# Patient Record
Sex: MALE | Race: OTHER | Employment: UNEMPLOYED | URBAN - METROPOLITAN AREA
[De-identification: names, ages, dates, MRNs, and addresses within clinical notes are randomized per-mention and may not be internally consistent; named-entity substitution may affect disease eponyms.]

---

## 2018-06-15 ENCOUNTER — OFFICE VISIT (OUTPATIENT)
Dept: NEUROLOGY | Age: 33
End: 2018-06-15

## 2018-06-15 VITALS
HEIGHT: 69 IN | OXYGEN SATURATION: 98 % | SYSTOLIC BLOOD PRESSURE: 124 MMHG | WEIGHT: 277 LBS | DIASTOLIC BLOOD PRESSURE: 80 MMHG | HEART RATE: 82 BPM | BODY MASS INDEX: 41.03 KG/M2

## 2018-06-15 DIAGNOSIS — M54.2 NECK PAIN: ICD-10-CM

## 2018-06-15 DIAGNOSIS — R53.82 CHRONIC FATIGUE: Primary | ICD-10-CM

## 2018-06-15 DIAGNOSIS — G44.229 CHRONIC TENSION-TYPE HEADACHE, NOT INTRACTABLE: ICD-10-CM

## 2018-06-15 PROBLEM — E66.01 OBESITY, MORBID (HCC): Status: ACTIVE | Noted: 2018-06-15

## 2018-06-18 ENCOUNTER — TELEPHONE (OUTPATIENT)
Dept: SLEEP MEDICINE | Age: 33
End: 2018-06-18

## 2018-06-18 ENCOUNTER — DOCUMENTATION ONLY (OUTPATIENT)
Dept: NEUROLOGY | Age: 33
End: 2018-06-18

## 2018-06-18 NOTE — TELEPHONE ENCOUNTER
Spoke with patient on 06/18/2018 to schedule a consult visit per Dr. Acacia Montes. Patient states he will call back to schedule appt.

## 2018-07-30 ENCOUNTER — DOCUMENTATION ONLY (OUTPATIENT)
Dept: NEUROLOGY | Age: 33
End: 2018-07-30

## 2022-03-19 PROBLEM — E66.01 OBESITY, MORBID (HCC): Status: ACTIVE | Noted: 2018-06-15

## 2022-03-21 ENCOUNTER — HOSPITAL ENCOUNTER (EMERGENCY)
Age: 37
Discharge: HOME | End: 2022-03-21
Attending: EMERGENCY MEDICINE

## 2022-03-21 VITALS
HEART RATE: 85 BPM | OXYGEN SATURATION: 98 % | SYSTOLIC BLOOD PRESSURE: 127 MMHG | TEMPERATURE: 98.8 F | DIASTOLIC BLOOD PRESSURE: 82 MMHG | HEIGHT: 69 IN | RESPIRATION RATE: 16 BRPM | WEIGHT: 220 LBS | BODY MASS INDEX: 32.58 KG/M2

## 2022-03-21 DIAGNOSIS — F41.0 ANXIETY ATTACK: Primary | ICD-10-CM

## 2022-03-21 DIAGNOSIS — F51.01 PRIMARY INSOMNIA: ICD-10-CM

## 2022-03-21 PROCEDURE — 99283 EMERGENCY DEPT VISIT LOW MDM: CPT

## 2022-03-21 RX ORDER — UREA 10 %
3 LOTION (ML) TOPICAL
Qty: 60 TABLET | Refills: 0 | Status: SHIPPED | OUTPATIENT
Start: 2022-03-21 | End: 2022-04-10

## 2022-03-21 RX ORDER — HYDROXYZINE PAMOATE 50 MG/1
50 CAPSULE ORAL
Qty: 30 CAPSULE | Refills: 0 | Status: SHIPPED | OUTPATIENT
Start: 2022-03-21

## 2022-03-21 RX ORDER — LORAZEPAM 0.5 MG/1
0.5 TABLET ORAL
Qty: 12 TABLET | Refills: 0 | Status: SHIPPED | OUTPATIENT
Start: 2022-03-21 | End: 2022-03-25

## 2022-03-21 NOTE — ED NOTES
Discharge instructions were given to the patient by Angeles. The patient left the Emergency Department ambulatory, alert and oriented and in no acute distress with 3 prescriptions. The patient was encouraged to call or return to the ED for worsening issues or problems and was encouraged to schedule a follow up appointment for continuing care. The patient verbalized understanding of discharge instructions and prescriptions, all questions were answered. The patient has no further concerns at this time.

## 2022-03-21 NOTE — ED NOTES

## 2022-03-21 NOTE — ED PROVIDER NOTES
EMERGENCY DEPARTMENT HISTORY AND PHYSICAL EXAM      Date: 3/21/2022  Patient Name: Олег Shi    History of Presenting Illness     Chief Complaint   Patient presents with    Medication Refill       History Provided By: Patient    HPI: Олег Shi, 39 y.o. male with past medical history of general anxiety disorder and panic attacks who presents emergency department with insomnia and anxiety. He reports he ran out of his lorazepam that was prescribed to him by a psychiatrist recently. His last appointment was 2 to 3 months ago. He had a severe anxiety attack 2 days ago and has not been able to get an appointment with a psychiatrist he has also had significant insomnia. He reports that he is problems are chronic in nature. He denies any suicidal or homicidal ideations. There are no other complaints, changes, or physical findings at this time. PCP: None    No current facility-administered medications on file prior to encounter. Current Outpatient Medications on File Prior to Encounter   Medication Sig Dispense Refill    promethazine (PHENERGAN) 25 mg tablet Take 1 Tab by mouth every six (6) hours as needed for Nausea. 12 Tab 0    [DISCONTINUED] lorazepam (ATIVAN) 0.5 mg tablet Take 1 Tab by mouth every eight (8) hours as needed for Anxiety. 12 Tab 0       Past History     Past Medical History:  Past Medical History:   Diagnosis Date    Psychiatric disorder     panic attacks       Past Surgical History:  No past surgical history on file. Family History:  No family history on file. Social History:  Social History     Tobacco Use    Smoking status: Current Some Day Smoker    Smokeless tobacco: Never Used   Substance Use Topics    Alcohol use: Yes    Drug use: No       Allergies: Allergies   Allergen Reactions    Caffeine Anxiety    Nickel Rash         Review of Systems   Review of Systems   Constitutional: Negative for chills and fever.    HENT: Negative for congestion and sore throat. Respiratory: Positive for chest tightness. Negative for cough and shortness of breath. Cardiovascular: Positive for palpitations. Negative for chest pain. Gastrointestinal: Negative for abdominal pain, diarrhea, nausea and vomiting. Genitourinary: Negative for dysuria and hematuria. Musculoskeletal: Negative for myalgias. Skin: Negative for rash. Neurological: Positive for headaches. All other systems reviewed and are negative. Physical Exam   Physical Exam  Vitals and nursing note reviewed. Constitutional:       General: He is not in acute distress. Appearance: He is not toxic-appearing. Comments: Patient resting comfortably in bed with no acute distress   HENT:      Head: Atraumatic. Right Ear: External ear normal.      Left Ear: External ear normal.      Nose: Nose normal.      Mouth/Throat:      Mouth: Mucous membranes are moist.   Eyes:      Extraocular Movements: Extraocular movements intact. Conjunctiva/sclera: Conjunctivae normal.   Cardiovascular:      Rate and Rhythm: Normal rate and regular rhythm. Pulses: Normal pulses. Heart sounds: Normal heart sounds. No murmur heard. No friction rub. No gallop. Pulmonary:      Effort: Pulmonary effort is normal. No respiratory distress. Breath sounds: Normal breath sounds. No stridor. No wheezing, rhonchi or rales. Abdominal:      General: Abdomen is flat. There is no distension. Palpations: Abdomen is soft. Tenderness: There is no abdominal tenderness. There is no guarding or rebound. Musculoskeletal:         General: No swelling. Cervical back: Neck supple. Skin:     General: Skin is warm. Neurological:      Mental Status: He is alert and oriented to person, place, and time. Psychiatric:         Mood and Affect: Mood normal.         Behavior: Behavior normal.         Thought Content:  Thought content normal.         Judgment: Judgment normal.      Comments: Mood, thought content, behavior normal.  No suicidal/homicidal ideations. Diagnostic Study Results     Labs -   No results found for this or any previous visit (from the past 12 hour(s)). Radiologic Studies -   No orders to display     CT Results  (Last 48 hours)    None        CXR Results  (Last 48 hours)    None            Medical Decision Making   I am the first provider for this patient. I reviewed the vital signs, available nursing notes, past medical history, past surgical history, family history and social history. Vital Signs-Reviewed the patient's vital signs. Patient Vitals for the past 12 hrs:   Temp Pulse Resp BP SpO2   03/21/22 1532 98.8 °F (37.1 °C) 85 16 127/82 98 %       Records Reviewed: Nursing Notes    Provider Notes (Medical Decision Making):   Patient evaluated for acute on chronic anxiety and insomnia. His last appointment with his psychiatrist was 2 to 3 months ago and he has ran out of his Ativan. He is not having any suicidal or homicidal ideations. No psychotic features. Patient's other symptoms most likely psychosomatic as he states these are consistent with his previous anxiety flareups. patient was given a short course of Ativan, Vistaril and melatonin. He was advised that he needs close follow-up with psychiatrist.  Patient given return precautions to the emergency department. Patient expressed understanding and agreement with the discharge instructions and treatment plan    ED Course:   Initial assessment performed. The patients presenting problems have been discussed, and they are in agreement with the care plan formulated and outlined with them. I have encouraged them to ask questions as they arise throughout their visit. Critical Care Time: None    Disposition:  Discharged     PLAN:  1.    Current Discharge Medication List      START taking these medications    Details   LORazepam (Ativan) 0.5 mg tablet Take 1 Tablet by mouth every eight (8) hours as needed for Anxiety for up to 4 days. Max Daily Amount: 1.5 mg.  Qty: 12 Tablet, Refills: 0  Start date: 3/21/2022, End date: 3/25/2022    Associated Diagnoses: Anxiety attack      hydrOXYzine pamoate (VistariL) 50 mg capsule Take 1 Capsule by mouth three (3) times daily as needed for Anxiety for up to 30 doses. Qty: 30 Capsule, Refills: 0  Start date: 3/21/2022      melatonin 1 mg tablet Take 3 Tablets by mouth nightly for 20 days. Qty: 60 Tablet, Refills: 0  Start date: 3/21/2022, End date: 4/10/2022           2. Follow-up Information     Follow up With Specialties Details Why Contact [facility] Schedule an appointment as soon as possible for a visit   [address] [facility] [address] [facility] EMERGENCY DEPT Emergency Medicine  If symptoms worsen [facility]  [phone]        Return to ED if worse     Diagnosis     Clinical Impression:   1. Anxiety attack    2. Primary insomnia          Please note that this dictation was completed with [employer] voice recognition software. Quite often unanticipated grammatical, syntax, homophones, and other interpretive errors are inadvertently transcribed by the computer software. Please disregards these errors. Please excuse any errors that have escaped final proofreading.

## 2022-03-21 NOTE — Clinical Note
[facility] EMERGENCY DEPT  [address] [address] [phone]    Work/School Note    Date: 3/21/2022    To Whom It May concern:    Anjel Sandoval was seen and treated today in the emergency room by the following provider(s):  Attending Provider: Jass Meeks MD  Physician Assistant: DIONNE Sam. Anjel Sandoval is excused from work/school on 3/21/2022 through 3/23/2022. He is medically clear to return to work/school on 3/24/2022.          Sincerely,          DIONNE Wheatley

## 2022-03-21 NOTE — DISCHARGE INSTRUCTIONS
Follow-up with your established psychiatrist for reevaluation of your anxiety and insomnia. You have also been provided contact information for 1 in your paperwork. You may go to any psychiatrist to continue with the earliest appointment, preferably in the next week. Return to the emergency department sooner if you have any new or worsening symptoms, including thoughts of harming yourself or someone else.

## 2023-03-06 ENCOUNTER — HOSPITAL ENCOUNTER (EMERGENCY)
Age: 38
Discharge: HOME | End: 2023-03-06
Attending: EMERGENCY MEDICINE
Payer: COMMERCIAL

## 2023-03-06 VITALS
SYSTOLIC BLOOD PRESSURE: 111 MMHG | OXYGEN SATURATION: 100 % | HEIGHT: 71 IN | WEIGHT: 225 LBS | RESPIRATION RATE: 20 BRPM | DIASTOLIC BLOOD PRESSURE: 83 MMHG | TEMPERATURE: 98.5 F | HEART RATE: 123 BPM | BODY MASS INDEX: 31.5 KG/M2

## 2023-03-06 DIAGNOSIS — R00.0 TACHYCARDIA: ICD-10-CM

## 2023-03-06 DIAGNOSIS — R19.7 DIARRHEA, UNSPECIFIED TYPE: Primary | ICD-10-CM

## 2023-03-06 PROCEDURE — 99283 EMERGENCY DEPT VISIT LOW MDM: CPT

## 2023-03-06 PROCEDURE — 74011250637 HC RX REV CODE- 250/637: Performed by: EMERGENCY MEDICINE

## 2023-03-06 RX ORDER — BISMUTH SUBSALICYLATE 262 MG/1
2 TABLET, CHEWABLE ORAL
Qty: 24 TABLET | Refills: 0 | Status: SHIPPED | OUTPATIENT
Start: 2023-03-06

## 2023-03-06 RX ORDER — LOPERAMIDE HYDROCHLORIDE 2 MG/1
2 CAPSULE ORAL
Qty: 20 CAPSULE | Refills: 0 | Status: SHIPPED | OUTPATIENT
Start: 2023-03-06 | End: 2023-03-16

## 2023-03-06 RX ORDER — LOPERAMIDE HYDROCHLORIDE 2 MG/1
4 CAPSULE ORAL
Status: COMPLETED | OUTPATIENT
Start: 2023-03-06 | End: 2023-03-06

## 2023-03-06 RX ADMIN — BISMUTH SUBSALICYLATE 1050 MG: 525 LIQUID ORAL at 05:13

## 2023-03-06 RX ADMIN — LOPERAMIDE HYDROCHLORIDE 4 MG: 2 CAPSULE ORAL at 05:09

## 2023-03-06 NOTE — ED TRIAGE NOTES
Diarrhea and chills starting tonight at midnight. Notes taking tylenol at 2 am w/ little relief.  Notes diarrhea has not stopped since 2 am. Pt tachycardic in 120s on arrival

## 2023-03-06 NOTE — ED PROVIDER NOTES
[facility] EMERGENCY DEPT  EMERGENCY DEPARTMENT ENCOUNTER       Pt Name: Red Valencia  MRN: [id number]  [place] 1985  Date of evaluation: 3/6/2023  Provider: Chetna Colon DO   PCP: None  Note Started: 5:08 AM 3/6/23     CHIEF COMPLAINT       Chief Complaint   Patient presents with    Chills    Diarrhea        HISTORY OF PRESENT ILLNESS: 1 or more elements      History From: Patient, History limited by: none     Red Valencia is a 40 y.o. male presenting the emergency department complaining of chills, diarrhea. Symptoms started today. Reports multiple episodes of loose stools. No vomiting, no abdominal pain. Reports chills, but no fever. Nothing makes his symptoms better. Please See MDM for Additional Details of the HPI/PMH  Nursing Notes were all reviewed and agreed with or any disagreements were addressed in the HPI. REVIEW OF SYSTEMS        Positives and Pertinent negatives as per HPI. PAST HISTORY     Past Medical History:  Past Medical History:   Diagnosis Date    Psychiatric disorder     panic attacks       Past Surgical History:  No past surgical history on file. Family History:  No family history on file. Social History:  Social History     Tobacco Use    Smoking status: Some Days     Packs/day: 0.25     Types: Cigarettes    Smokeless tobacco: Never   Substance Use Topics    Alcohol use: Yes    Drug use: No       Allergies: Allergies   Allergen Reactions    Caffeine Anxiety    Nickel Rash       CURRENT MEDICATIONS      Previous Medications    HYDROXYZINE PAMOATE (VISTARIL) 50 MG CAPSULE    Take 1 Capsule by mouth three (3) times daily as needed for Anxiety for up to 30 doses. PROMETHAZINE (PHENERGAN) 25 MG TABLET    Take 1 Tab by mouth every six (6) hours as needed for Nausea.        SCREENINGS               No data recorded         PHYSICAL EXAM      ED Triage Vitals [03/06/23 0442]   ED Encounter Vitals Group      /83      Pulse (Heart Rate) (!) 123      Resp Rate 20      Temp 98.5 °F (36.9 °C)      Temp src       O2 Sat (%) 100 %      Weight 225 lb      Height 5' 11\"        Physical Exam  Vitals and nursing note reviewed. Constitutional:       Appearance: He is well-developed. He is obese. HENT:      Head: Normocephalic and atraumatic. Eyes:      General:         Right eye: No discharge. Left eye: No discharge. Conjunctiva/sclera: Conjunctivae normal.      Pupils: Pupils are equal, round, and reactive to light. Neck:      Trachea: No tracheal deviation. Cardiovascular:      Rate and Rhythm: Regular rhythm. Tachycardia present. Heart sounds: Normal heart sounds. No murmur heard. Pulmonary:      Effort: Pulmonary effort is normal. No respiratory distress. Breath sounds: Normal breath sounds. No wheezing or rales. Abdominal:      General: Bowel sounds are normal.      Palpations: Abdomen is soft. Tenderness: There is no abdominal tenderness. There is no guarding or rebound. Musculoskeletal:         General: No tenderness or deformity. Normal range of motion. Cervical back: Normal range of motion and neck supple. Skin:     General: Skin is warm and dry. Findings: No erythema or rash. Neurological:      Mental Status: He is alert and oriented to person, place, and time. Psychiatric:         Behavior: Behavior normal.        DIAGNOSTIC RESULTS   LABS:     No results found for this or any previous visit (from the past 12 hour(s)). EKG: If performed, independent interpretation documented below in the MDM section     RADIOLOGY:  Non-plain film images such as CT, Ultrasound and MRI are read by the radiologist. Plain radiographic images are visualized and preliminarily interpreted by the ED Provider with the findings documented in the MDM section. Interpretation per the Radiologist below, if available at the time of this note:     No results found.       PROCEDURES   Unless otherwise noted below, none  Procedures CRITICAL CARE TIME       EMERGENCY DEPARTMENT COURSE and DIFFERENTIAL DIAGNOSIS/MDM   Vitals:    Vitals:    03/06/23 0442   BP: 111/83   Pulse: (!) 123   Resp: 20   Temp: 98.5 °F (36.9 °C)   SpO2: 100%   Weight: 102.1 kg (225 lb)   Height: 5' 11\" (1.803 m)        Patient was given the following medications:  Medications   loperamide (IMODIUM) capsule 4 mg (has no administration in time range)   bismuth subsalicylate (PEPTO-BISMOL MAXIMUM STRENGTH) 525 mg/15 mL oral suspension 1,050 mg (has no administration in time range)       Medical Decision Making  49-year-old male found to be tachycardic on exam, but otherwise nontoxic. His abdomen is nontender. Likely viral diarrheal illness versus food poisoning. No recent travel, no recent antibiotics. He is tachycardic on exam.  I explained to the patient my desire to check basic labs with concern for dehydration, electrolyte abnormalities. Wanted to give him IV fluids. Patient states that he does not have time for this at the moment. He would just like treatment for the diarrhea and will return if symptoms or not improving or if things get worse. I explained that this is an incomplete evaluation and I do have concerns about sending him out with his fast heart rate. Patient understands and states he will drink plenty of fluids. He will return with any change or worsening of symptoms. He will return if his symptoms or not improving. Risk  OTC drugs. Prescription drug management. FINAL IMPRESSION     1. Diarrhea, unspecified type    2. Tachycardia          DISPOSITION/PLAN   Yogesh Dailey's  results have been reviewed with him. He has been counseled regarding his diagnosis, treatment, and plan. He verbally conveys understanding and agreement of the signs, symptoms, diagnosis, treatment and prognosis and additionally agrees to follow up as discussed.   He also agrees with the care-plan and conveys that all of his questions have been answered. I have also provided discharge instructions for him that include: educational information regarding their diagnosis and treatment, and list of reasons why they would want to return to the ED prior to their follow-up appointment, should his condition change. CLINICAL IMPRESSION    Discharge Note: The patient is stable for discharge home. The signs, symptoms, diagnosis, and discharge instructions have been discussed, understanding conveyed, and agreed upon. The patient is to follow up as recommended or return to ER should their symptoms worsen. PATIENT REFERRED TO:  Follow-up Information       Follow up With Specialties Details Why Contact Info    [facility] EMERGENCY DEPT Emergency Medicine  If symptoms worsen [address]              DISCHARGE MEDICATIONS:  Current Discharge Medication List        START taking these medications    Details   loperamide (IMODIUM) 2 mg capsule Take 1 Capsule by mouth four (4) times daily as needed for Diarrhea for up to 10 days. Qty: 20 Capsule, Refills: 0  Start date: 3/6/2023, End date: 3/16/2023      bismuth subsalicylate (Pepto-BismoL) 262 mg chew Take 2 Tablets by mouth every three (3) hours as needed for Diarrhea. Qty: 24 Tablet, Refills: 0  Start date: 3/6/2023               DISCONTINUED MEDICATIONS:  Current Discharge Medication List          I am the Primary Clinician of Record. Lillian Kent DO (electronically signed)    (Please note that parts of this dictation were completed with voice recognition software. Quite often unanticipated grammatical, syntax, homophones, and other interpretive errors are inadvertently transcribed by the computer software. Please disregards these errors.  Please excuse any errors that have escaped final proofreading.)

## 2023-03-06 NOTE — ED NOTES
Discharge instructions were given to the patient by Kalie Ruiz RN. The patient left the Emergency Department ambulatory, alert and oriented and in no acute distress with 2   prescriptions. The patient was encouraged to call or return to the ED for worsening issues or problems and was encouraged to schedule a follow up appointment for continuing care. The patient verbalized understanding of discharge instructions and prescriptions, all questions were answered. The patient has no further concerns at this time.
Pt presents to ED ambulatory complaining of Diarrhea. Pt is alert and oriented x 4, RR even and unlabored, skin is warm and dry. Assessment completed and pt updated on plan of care. Call bell in reach. Emergency Department Nursing Plan of Care       The Nursing Plan of Care is developed from the Nursing assessment and Emergency Department Attending provider initial evaluation. The plan of care may be reviewed in the ED Provider note.     The Plan of Care was developed with the following considerations:   Patient / Family readiness to learn indicated by:verbalized understanding  Persons(s) to be included in education: patient  Barriers to Learning/Limitations:No    Signed     Wes Duran RN    3/6/2023   5:02 AM
detailed exam

## 2023-03-06 NOTE — Clinical Note
[facility] EMERGENCY DEPT  [address] [address] [phone]    Work/School Note    Date: 3/6/2023    To Whom It May concern:      Harris Moore was seen and treated today in the emergency room by the following provider(s):  Attending Provider: Hung Isabel is excused from work/school on 03/06/23. He is clear to return to work/school on 03/07/23.         Sincerely,          Isis Moeller, DO

## 2023-04-24 ENCOUNTER — APPOINTMENT (OUTPATIENT)
Dept: CT IMAGING | Age: 38
End: 2023-04-24
Attending: NURSE PRACTITIONER
Payer: COMMERCIAL

## 2023-04-24 ENCOUNTER — APPOINTMENT (OUTPATIENT)
Dept: GENERAL RADIOLOGY | Age: 38
End: 2023-04-24
Attending: NURSE PRACTITIONER
Payer: COMMERCIAL

## 2023-04-24 ENCOUNTER — HOSPITAL ENCOUNTER (EMERGENCY)
Age: 38
Discharge: HOME | End: 2023-04-24
Attending: EMERGENCY MEDICINE
Payer: COMMERCIAL

## 2023-04-24 VITALS
TEMPERATURE: 98 F | OXYGEN SATURATION: 94 % | SYSTOLIC BLOOD PRESSURE: 127 MMHG | HEART RATE: 93 BPM | DIASTOLIC BLOOD PRESSURE: 71 MMHG | RESPIRATION RATE: 16 BRPM

## 2023-04-24 DIAGNOSIS — R10.817 GENERALIZED ABDOMINAL TENDERNESS WITHOUT REBOUND TENDERNESS: Primary | ICD-10-CM

## 2023-04-24 DIAGNOSIS — F10.10 ETOH ABUSE: ICD-10-CM

## 2023-04-24 LAB
ALBUMIN SERPL-MCNC: 3.5 G/DL (ref 3.5–5)
ALBUMIN/GLOB SERPL: 0.9 (ref 1.1–2.2)
ALP SERPL-CCNC: 137 U/L (ref 45–117)
ALT SERPL-CCNC: 90 U/L (ref 12–78)
ANION GAP SERPL CALC-SCNC: 7 MMOL/L (ref 5–15)
APPEARANCE UR: CLEAR
AST SERPL-CCNC: 212 U/L (ref 15–37)
ATRIAL RATE: 105 BPM
BASOPHILS # BLD: 0 K/UL (ref 0–0.1)
BASOPHILS NFR BLD: 0 % (ref 0–1)
BILIRUB SERPL-MCNC: 0.8 MG/DL (ref 0.2–1)
BILIRUB UR QL: NEGATIVE
BUN SERPL-MCNC: 10 MG/DL (ref 6–20)
BUN/CREAT SERPL: 13 (ref 12–20)
CALCIUM SERPL-MCNC: 7.5 MG/DL (ref 8.5–10.1)
CALCULATED P AXIS, ECG09: 53 DEGREES
CALCULATED R AXIS, ECG10: 55 DEGREES
CALCULATED T AXIS, ECG11: 61 DEGREES
CHLORIDE SERPL-SCNC: 104 MMOL/L (ref 97–108)
CO2 SERPL-SCNC: 21 MMOL/L (ref 21–32)
COLOR UR: YELLOW
COMMENT, HOLDF: NORMAL
CREAT SERPL-MCNC: 0.76 MG/DL (ref 0.7–1.3)
DIAGNOSIS, 93000: NORMAL
DIFFERENTIAL METHOD BLD: ABNORMAL
EOSINOPHIL # BLD: 0.1 K/UL (ref 0–0.4)
EOSINOPHIL NFR BLD: 1 % (ref 0–7)
ERYTHROCYTE [DISTWIDTH] IN BLOOD BY AUTOMATED COUNT: 13.8 % (ref 11.5–14.5)
ETHANOL SERPL-MCNC: 249 MG/DL
GLOBULIN SER CALC-MCNC: 4.1 G/DL (ref 2–4)
GLUCOSE SERPL-MCNC: 112 MG/DL (ref 65–100)
GLUCOSE UR STRIP.AUTO-MCNC: NEGATIVE MG/DL
HCT VFR BLD AUTO: 46.8 % (ref 36.6–50.3)
HGB BLD-MCNC: 16.5 G/DL (ref 12.1–17)
HGB UR QL STRIP: NEGATIVE
IMM GRANULOCYTES # BLD AUTO: 0 K/UL (ref 0–0.04)
IMM GRANULOCYTES NFR BLD AUTO: 1 % (ref 0–0.5)
KETONES UR QL STRIP.AUTO: NEGATIVE MG/DL
LEUKOCYTE ESTERASE UR QL STRIP.AUTO: NEGATIVE
LIPASE SERPL-CCNC: 217 U/L (ref 73–393)
LYMPHOCYTES # BLD: 1.7 K/UL (ref 0.8–3.5)
LYMPHOCYTES NFR BLD: 32 % (ref 12–49)
MAGNESIUM SERPL-MCNC: 2.2 MG/DL (ref 1.6–2.4)
MCH RBC QN AUTO: 34 PG (ref 26–34)
MCHC RBC AUTO-ENTMCNC: 35.3 G/DL (ref 30–36.5)
MCV RBC AUTO: 96.5 FL (ref 80–99)
MONOCYTES # BLD: 0.4 K/UL (ref 0–1)
MONOCYTES NFR BLD: 8 % (ref 5–13)
NEUTS SEG # BLD: 3 K/UL (ref 1.8–8)
NEUTS SEG NFR BLD: 58 % (ref 32–75)
NITRITE UR QL STRIP.AUTO: NEGATIVE
NRBC # BLD: 0 K/UL (ref 0–0.01)
NRBC BLD-RTO: 0 PER 100 WBC
P-R INTERVAL, ECG05: 136 MS
PH UR STRIP: 5 (ref 5–8)
PLATELET # BLD AUTO: 148 K/UL (ref 150–400)
PMV BLD AUTO: 10.1 FL (ref 8.9–12.9)
POTASSIUM SERPL-SCNC: 3.7 MMOL/L (ref 3.5–5.1)
PROT SERPL-MCNC: 7.6 G/DL (ref 6.4–8.2)
PROT UR STRIP-MCNC: NEGATIVE MG/DL
Q-T INTERVAL, ECG07: 324 MS
QRS DURATION, ECG06: 80 MS
QTC CALCULATION (BEZET), ECG08: 428 MS
RBC # BLD AUTO: 4.85 M/UL (ref 4.1–5.7)
SAMPLES BEING HELD,HOLD: NORMAL
SODIUM SERPL-SCNC: 132 MMOL/L (ref 136–145)
SP GR UR REFRACTOMETRY: 1.01 (ref 1–1.03)
UROBILINOGEN UR QL STRIP.AUTO: 0.2 EU/DL (ref 0.2–1)
VENTRICULAR RATE, ECG03: 105 BPM
WBC # BLD AUTO: 5.2 K/UL (ref 4.1–11.1)

## 2023-04-24 PROCEDURE — 74011250636 HC RX REV CODE- 250/636: Performed by: NURSE PRACTITIONER

## 2023-04-24 PROCEDURE — 80053 COMPREHEN METABOLIC PANEL: CPT

## 2023-04-24 PROCEDURE — 81003 URINALYSIS AUTO W/O SCOPE: CPT

## 2023-04-24 PROCEDURE — 96361 HYDRATE IV INFUSION ADD-ON: CPT

## 2023-04-24 PROCEDURE — 96375 TX/PRO/DX INJ NEW DRUG ADDON: CPT

## 2023-04-24 PROCEDURE — 74011000636 HC RX REV CODE- 636: Performed by: EMERGENCY MEDICINE

## 2023-04-24 PROCEDURE — 36415 COLL VENOUS BLD VENIPUNCTURE: CPT

## 2023-04-24 PROCEDURE — 85025 COMPLETE CBC W/AUTO DIFF WBC: CPT

## 2023-04-24 PROCEDURE — 83690 ASSAY OF LIPASE: CPT

## 2023-04-24 PROCEDURE — 71046 X-RAY EXAM CHEST 2 VIEWS: CPT

## 2023-04-24 PROCEDURE — 82077 ASSAY SPEC XCP UR&BREATH IA: CPT

## 2023-04-24 PROCEDURE — 93005 ELECTROCARDIOGRAM TRACING: CPT

## 2023-04-24 PROCEDURE — 96374 THER/PROPH/DIAG INJ IV PUSH: CPT

## 2023-04-24 PROCEDURE — 74177 CT ABD & PELVIS W/CONTRAST: CPT

## 2023-04-24 PROCEDURE — 99285 EMERGENCY DEPT VISIT HI MDM: CPT

## 2023-04-24 PROCEDURE — 83735 ASSAY OF MAGNESIUM: CPT

## 2023-04-24 RX ORDER — ONDANSETRON 4 MG/1
4 TABLET, ORALLY DISINTEGRATING ORAL
Qty: 10 TABLET | Refills: 0 | Status: SHIPPED | OUTPATIENT
Start: 2023-04-24 | End: 2023-04-29

## 2023-04-24 RX ORDER — ONDANSETRON 2 MG/ML
4 INJECTION INTRAMUSCULAR; INTRAVENOUS
Status: COMPLETED | OUTPATIENT
Start: 2023-04-24 | End: 2023-04-24

## 2023-04-24 RX ORDER — MORPHINE SULFATE 2 MG/ML
2 INJECTION, SOLUTION INTRAMUSCULAR; INTRAVENOUS ONCE
Status: COMPLETED | OUTPATIENT
Start: 2023-04-24 | End: 2023-04-24

## 2023-04-24 RX ADMIN — IOHEXOL 50 ML: 350 INJECTION, SOLUTION INTRAVENOUS at 15:45

## 2023-04-24 RX ADMIN — SODIUM CHLORIDE 1000 ML: 9 INJECTION, SOLUTION INTRAVENOUS at 17:32

## 2023-04-24 RX ADMIN — ONDANSETRON 4 MG: 2 INJECTION INTRAMUSCULAR; INTRAVENOUS at 15:18

## 2023-04-24 RX ADMIN — MORPHINE SULFATE 2 MG: 2 INJECTION, SOLUTION INTRAMUSCULAR; INTRAVENOUS at 15:18

## 2023-04-24 NOTE — ED TRIAGE NOTES
Patient reports vomiting \"yellow stuff\" for 2 days. Drinking heavily for 3 days prior. He is weak and dizzy. Tremors noted to hands. He reports \"it [birth year] my birthday\" patient denies daily drinking or etoh problems.

## 2023-04-24 NOTE — ED PROVIDER NOTES
Patient Declines . HPI   Patient is a 45 y.o. M with pmhx of obesity and panic attacks who presents today with complaints of N,V,D, and abdominal pain for the last 2 days. Described the pain as crampy starting in in his LLQ and radiating to left flank and   to RLQ. No aggravating or alleviating factors. Pain is persistent but waxes and wanes in intensity. No fever. Bothing nausea, vomiting. States has noticed red flecks in emesis but denies gross hematemesis. States emesis is yellow. Has not been able to keep down fluids. Multiple episodes of emesis and diarrhea today. Endorsing chest discomfort and shortness of breath. No dysuria, hematuria. No constipation, hematochezia, melena. No history of abdominal surgery. Recently heavy ETOH use for last 3 days, states he has been drinking beer. Denies regular daily alcohol use, was drinking heavily as it was his birthday weekend. Last drink was early this morning around 6-7 am as he thought it might help his symptoms. There are no other complaints, changes or physical findings at this time. Smoking: None  Alcohol: Heavy over the last 3 days but denies regular alcohol use. Drug Use: None  ALLERGIES: Caffeine and Nickel    Past Medical History:   Diagnosis Date    Psychiatric disorder     panic attacks     No past surgical history on file. No family history on file.   Social History     Socioeconomic History    Marital status:      Spouse name: Not on file    Number of children: Not on file    Years of education: Not on file    Highest education level: Not on file   Occupational History    Not on file   Tobacco Use    Smoking status: Some Days     Packs/day: 0.25     Types: Cigarettes    Smokeless tobacco: Never   Substance and Sexual Activity    Alcohol use: Yes    Drug use: No    Sexual activity: Not Currently   Other Topics Concern    Not on file   Social History Narrative    Not on file     Social Determinants of Health Financial Resource Strain: Not on file   Food Insecurity: Not on file   Transportation Needs: Not on file   Physical Activity: Not on file   Stress: Not on file   Social Connections: Not on file   Intimate Partner Violence: Not on file   Housing Stability: Not on file           Review of Systems   Constitutional:  Negative for fever. HENT:  Negative for congestion. Eyes:  Negative for visual disturbance. Respiratory:  Negative for shortness of breath. Cardiovascular:  Positive for chest pain. Gastrointestinal:  Positive for abdominal pain. Negative for nausea. Endocrine: Negative for polyuria. Genitourinary:  Negative for dysuria. Musculoskeletal:  Negative for back pain. Skin:  Negative for color change. Neurological:  Negative for seizures. Hematological:  Does not bruise/bleed easily. Psychiatric/Behavioral:  Negative for hallucinations. Vitals:    04/24/23 1341 04/24/23 1908   BP: (!) 153/72 127/71   Pulse: (!) 109 93   Resp: 18 16   Temp: 98 °F (36.7 °C)    SpO2: 97% 94%            Physical Exam  Constitutional:       Appearance: Normal appearance. HENT:      Head: Normocephalic and atraumatic. Eyes:      Pupils: Pupils are equal, round, and reactive to light. Cardiovascular:      Rate and Rhythm: Normal rate and regular rhythm. Heart sounds: No murmur heard. No friction rub. No gallop. Pulmonary:      Effort: Pulmonary effort is normal.      Breath sounds: Normal breath sounds. Abdominal:      General: There is no distension. Palpations: Abdomen is soft. Tenderness: There is no right CVA tenderness or left CVA tenderness. Musculoskeletal:      Cervical back: Normal range of motion. Skin:     General: Skin is dry. Neurological:      General: No focal deficit present. Mental Status: He is alert and oriented to person, place, and time.    Psychiatric:         Mood and Affect: Mood normal.         Behavior: Behavior normal. LABORATORY RESULTS:  Recent Results (from the past 24 hour(s))   CBC WITH AUTOMATED DIFF    Collection Time: 04/24/23  2:20 PM   Result Value Ref Range    WBC 5.2 4.1 - 11.1 K/uL    RBC 4.85 4.10 - 5.70 M/uL    HGB 16.5 12.1 - 17.0 g/dL    HCT 46.8 36.6 - 50.3 %    MCV 96.5 80.0 - 99.0 FL    MCH 34.0 26.0 - 34.0 PG    MCHC 35.3 30.0 - 36.5 g/dL    RDW 13.8 11.5 - 14.5 %    PLATELET 554 (L) 284 - 400 K/uL    MPV 10.1 8.9 - 12.9 FL    NRBC 0.0 0  WBC    ABSOLUTE NRBC 0.00 0.00 - 0.01 K/uL    NEUTROPHILS 58 32 - 75 %    LYMPHOCYTES 32 12 - 49 %    MONOCYTES 8 5 - 13 %    EOSINOPHILS 1 0 - 7 %    BASOPHILS 0 0 - 1 %    IMMATURE GRANULOCYTES 1 (H) 0.0 - 0.5 %    ABS. NEUTROPHILS 3.0 1.8 - 8.0 K/UL    ABS. LYMPHOCYTES 1.7 0.8 - 3.5 K/UL    ABS. MONOCYTES 0.4 0.0 - 1.0 K/UL    ABS. EOSINOPHILS 0.1 0.0 - 0.4 K/UL    ABS. BASOPHILS 0.0 0.0 - 0.1 K/UL    ABS. IMM. GRANS. 0.0 0.00 - 0.04 K/UL    DF AUTOMATED     METABOLIC PANEL, COMPREHENSIVE    Collection Time: 04/24/23  2:20 PM   Result Value Ref Range    Sodium 132 (L) 136 - 145 mmol/L    Potassium 3.7 3.5 - 5.1 mmol/L    Chloride 104 97 - 108 mmol/L    CO2 21 21 - 32 mmol/L    Anion gap 7 5 - 15 mmol/L    Glucose 112 (H) 65 - 100 mg/dL    BUN 10 6 - 20 MG/DL    Creatinine 0.76 0.70 - 1.30 MG/DL    BUN/Creatinine ratio 13 12 - 20      eGFR >60 >60 ml/min/1.73m2    Calcium 7.5 (L) 8.5 - 10.1 MG/DL    Bilirubin, total 0.8 0.2 - 1.0 MG/DL    ALT (SGPT) 90 (H) 12 - 78 U/L    AST (SGOT) 212 (H) 15 - 37 U/L    Alk. phosphatase 137 (H) 45 - 117 U/L    Protein, total 7.6 6.4 - 8.2 g/dL    Albumin 3.5 3.5 - 5.0 g/dL    Globulin 4.1 (H) 2.0 - 4.0 g/dL    A-G Ratio 0.9 (L) 1.1 - 2.2     SAMPLES BEING HELD    Collection Time: 04/24/23  2:20 PM   Result Value Ref Range    SAMPLES BEING HELD 1BLU     COMMENT        Add-on orders for these samples will be processed based on acceptable specimen integrity and analyte stability, which may vary by analyte.    ETHYL ALCOHOL Collection Time: 04/24/23  2:20 PM   Result Value Ref Range    ALCOHOL(ETHYL),SERUM 249 (H) <10 MG/DL   LIPASE    Collection Time: 04/24/23  2:20 PM   Result Value Ref Range    Lipase 217 73 - 393 U/L   MAGNESIUM    Collection Time: 04/24/23  2:30 PM   Result Value Ref Range    Magnesium 2.2 1.6 - 2.4 mg/dL   EKG, 12 LEAD, INITIAL    Collection Time: 04/24/23  2:37 PM   Result Value Ref Range    Ventricular Rate 105 BPM    Atrial Rate 105 BPM    P-R Interval 136 ms    QRS Duration 80 ms    Q-T Interval 324 ms    QTC Calculation (Bezet) 428 ms    Calculated P Axis 53 degrees    Calculated R Axis 55 degrees    Calculated T Axis 61 degrees    Diagnosis       Sinus tachycardia  Otherwise normal ECG  When compared with ECG of 18-MAY-2011 14:03,  No significant change was found  Confirmed by Alphonso Mijares MD ([id number]) on 4/24/2023 8:18:16 PM     URINALYSIS W/ RFLX MICROSCOPIC    Collection Time: 04/24/23  3:22 PM   Result Value Ref Range    Color YELLOW      Appearance CLEAR CLEAR      Specific gravity 1.013 1.003 - 1.030      pH (UA) 5.0 5.0 - 8.0      Protein Negative NEG mg/dL    Glucose Negative NEG mg/dL    Ketone Negative NEG mg/dL    Bilirubin Negative NEG      Blood Negative NEG      Urobilinogen 0.2 0.2 - 1.0 EU/dL    Nitrites Negative NEG      Leukocyte Esterase Negative NEG         IMAGING RESULTS:  XR CHEST PA LAT    Result Date: 4/24/2023  No acute intrathoracic disease. CT ABD PELV W CONT    Result Date: 4/24/2023  No acute intraperitoneal process is identified. Incidental and/or nonemergent findings are as described above.        MEDICATIONS GIVEN:  Medications   morphine injection 2 mg (2 mg IntraVENous Given 4/24/23 1518)   ondansetron (ZOFRAN) injection 4 mg (4 mg IntraVENous Given 4/24/23 1518)   iohexoL (OMNIPAQUE) 350 mg iodine/mL contrast injection 50 mL (50 mL IntraVENous Given 4/24/23 1545)   iohexoL (OMNIPAQUE) 350 mg iodine/mL contrast injection 50 mL (50 mL IntraVENous Given 4/24/23 1545) sodium chloride 0.9 % bolus infusion 1,000 mL (0 mL IntraVENous IV Completed 4/24/23 1915)          Medical Decision Making  27-year-old male presents to emergency department with nausea, vomiting, diarrhea and abdominal pain after 3 days of heavy EtOH use. Patient is fully oriented, nontremulous, nonagitated and nondiaphoretic. In addition the patient was emergency department seen napping comfortably. He denies regular daily EtOH use making risk for EtOH withdrawal unlikely. As patient endorsed trace hematemesis, chest x-ray, abdominal CT obtained. Hemoglobin is reassuringly at 16.5. Chest x-ray and abdominal CT unremarkable. Considered alcohol pancreatitis in setting of abdominal pain and heavy alcohol use. Lipase unremarkable. Considered ACS in setting of chest discomfort, troponin unremarkable and EKG without evidence of ischemia    Lab work does is notable for mild hyponatremia and elevated LFTs which can be expected affected after 3 days of heavy EtOH use. Patient given liter of IV fluids here in the emergency department. He has had no further episodes of emesis here in the emergency room. Counseled on importance of alcohol cessation. Patient observed here in the emergency department for 7 hours during his work-up. He is well-appearing with stable vital signs and discharge at baseline mental status. Is able to ambulate throughout the emergency department. Discharged with . Given PMD and GI follow-up. Amount and/or Complexity of Data Reviewed  Labs: ordered. Radiology: ordered. ECG/medicine tests: ordered. Risk  Prescription drug management. Discussed results and work-up with patient and answered all questions, the patient expresses understanding and agrees with the care plan and disposition. The patient was given an opportunity to ask questions and all concerns raised were addressed prior to discharge.   Recommended patient follow-up with provider as listed below. Counseled patient on standard home and self-care measures. Specifically explained the emergent conditions that could arise and clearly instructed the patient to return to the emergency department for those and any other new, worsening, or concerning symptoms. Patient stable and ready for discharge. IMPRESSION:  1. Generalized abdominal tenderness without rebound tenderness    2. ETOH abuse        DISPOSITION:  Discharge    PLAN:  Follow-up Information       Follow up With Specialties Details Why Contact Info    [address] [facility] Emergency Medicine  As needed, If symptoms worsen [address] [facility]  [phone]    [facility]    [address] [place] [address] [id number]  [phone]          Discharge Medication List as of 4/24/2023  7:42 PM        START taking these medications    Details   ondansetron (ZOFRAN ODT) 4 mg disintegrating tablet Take 1 Tablet by mouth every eight (8) hours as needed for Nausea or Vomiting for up to 5 days. , Normal, Disp-10 Tablet, R-0           CONTINUE these medications which have NOT CHANGED    Details   bismuth subsalicylate (Pepto-BismoL) 262 mg chew Take 2 Tablets by mouth every three (3) hours as needed for Diarrhea., Normal, Disp-24 Tablet, R-0      hydrOXYzine pamoate (VistariL) 50 mg capsule Take 1 Capsule by mouth three (3) times daily as needed for Anxiety for up to 30 doses. , Normal, Disp-30 Capsule, R-0      promethazine (PHENERGAN) 25 mg tablet Take 1 Tab by mouth every six (6) hours as needed for Nausea. , Print, Disp-12 Tab, R-0             Please note that this dictation was completed with [employer], the [employer] voice recognition software. Quite often unanticipated grammatical, syntax, homophones, and other interpretive errors are inadvertently transcribed by the computer software. Please disregard these errors. Please excuse any errors that have escaped final proofreading.

## 2024-05-26 ENCOUNTER — APPOINTMENT (OUTPATIENT)
Facility: HOSPITAL | Age: 39
End: 2024-05-26
Payer: COMMERCIAL

## 2024-05-26 ENCOUNTER — HOSPITAL ENCOUNTER (EMERGENCY)
Facility: HOSPITAL | Age: 39
Discharge: PSYCHIATRIC OR SUBSTANCE USE FACILITY | End: 2024-05-27
Attending: EMERGENCY MEDICINE
Payer: COMMERCIAL

## 2024-05-26 DIAGNOSIS — T14.91XA SUICIDE ATTEMPT (HCC): Primary | ICD-10-CM

## 2024-05-26 DIAGNOSIS — F10.10 ETOH ABUSE: ICD-10-CM

## 2024-05-26 LAB
ALBUMIN SERPL-MCNC: 3.8 G/DL (ref 3.5–5)
ALBUMIN/GLOB SERPL: 0.9 (ref 1.1–2.2)
ALP SERPL-CCNC: 172 U/L (ref 45–117)
ALT SERPL-CCNC: 156 U/L (ref 12–78)
AMPHET UR QL SCN: NEGATIVE
ANION GAP SERPL CALC-SCNC: 9 MMOL/L (ref 5–15)
APAP SERPL-MCNC: <2 UG/ML (ref 10–30)
APPEARANCE UR: CLEAR
AST SERPL-CCNC: 190 U/L (ref 15–37)
BACTERIA URNS QL MICRO: NEGATIVE /HPF
BARBITURATES UR QL SCN: NEGATIVE
BASOPHILS # BLD: 0 K/UL (ref 0–0.1)
BASOPHILS NFR BLD: 1 % (ref 0–1)
BENZODIAZ UR QL: NEGATIVE
BILIRUB SERPL-MCNC: 0.4 MG/DL (ref 0.2–1)
BILIRUB UR QL: NEGATIVE
BUN SERPL-MCNC: 4 MG/DL (ref 6–20)
BUN/CREAT SERPL: 5 (ref 12–20)
CALCIUM SERPL-MCNC: 8.6 MG/DL (ref 8.5–10.1)
CANNABINOIDS UR QL SCN: NEGATIVE
CHLORIDE SERPL-SCNC: 107 MMOL/L (ref 97–108)
CO2 SERPL-SCNC: 23 MMOL/L (ref 21–32)
COCAINE UR QL SCN: NEGATIVE
COLOR UR: NORMAL
COMMENT:: NORMAL
CREAT SERPL-MCNC: 0.84 MG/DL (ref 0.7–1.3)
DIFFERENTIAL METHOD BLD: ABNORMAL
EKG ATRIAL RATE: 112 BPM
EKG DIAGNOSIS: NORMAL
EKG P AXIS: 49 DEGREES
EKG P-R INTERVAL: 140 MS
EKG Q-T INTERVAL: 330 MS
EKG QRS DURATION: 78 MS
EKG QTC CALCULATION (BAZETT): 450 MS
EKG R AXIS: 47 DEGREES
EKG T AXIS: 23 DEGREES
EKG VENTRICULAR RATE: 112 BPM
EOSINOPHIL # BLD: 0.2 K/UL (ref 0–0.4)
EOSINOPHIL NFR BLD: 4 % (ref 0–7)
EPITH CASTS URNS QL MICRO: NORMAL /LPF
ERYTHROCYTE [DISTWIDTH] IN BLOOD BY AUTOMATED COUNT: 12.6 % (ref 11.5–14.5)
ETHANOL SERPL-MCNC: 289 MG/DL (ref 0–0.08)
GLOBULIN SER CALC-MCNC: 4.3 G/DL (ref 2–4)
GLUCOSE SERPL-MCNC: 112 MG/DL (ref 65–100)
GLUCOSE UR STRIP.AUTO-MCNC: NEGATIVE MG/DL
HCT VFR BLD AUTO: 46 % (ref 36.6–50.3)
HGB BLD-MCNC: 15.8 G/DL (ref 12.1–17)
HGB UR QL STRIP: NEGATIVE
HYALINE CASTS URNS QL MICRO: NORMAL /LPF (ref 0–5)
IMM GRANULOCYTES # BLD AUTO: 0 K/UL (ref 0–0.04)
IMM GRANULOCYTES NFR BLD AUTO: 0 % (ref 0–0.5)
KETONES UR QL STRIP.AUTO: NEGATIVE MG/DL
LEUKOCYTE ESTERASE UR QL STRIP.AUTO: NEGATIVE
LYMPHOCYTES # BLD: 1.8 K/UL (ref 0.8–3.5)
LYMPHOCYTES NFR BLD: 42 % (ref 12–49)
Lab: NORMAL
MCH RBC QN AUTO: 33.4 PG (ref 26–34)
MCHC RBC AUTO-ENTMCNC: 34.3 G/DL (ref 30–36.5)
MCV RBC AUTO: 97.3 FL (ref 80–99)
METHADONE UR QL: NEGATIVE
MONOCYTES # BLD: 0.4 K/UL (ref 0–1)
MONOCYTES NFR BLD: 10 % (ref 5–13)
NEUTS SEG # BLD: 1.9 K/UL (ref 1.8–8)
NEUTS SEG NFR BLD: 44 % (ref 32–75)
NITRITE UR QL STRIP.AUTO: NEGATIVE
NRBC # BLD: 0 K/UL (ref 0–0.01)
NRBC BLD-RTO: 0 PER 100 WBC
OPIATES UR QL: NEGATIVE
PCP UR QL: NEGATIVE
PH UR STRIP: 5.5 (ref 5–8)
PLATELET # BLD AUTO: 144 K/UL (ref 150–400)
PMV BLD AUTO: 10.5 FL (ref 8.9–12.9)
POTASSIUM SERPL-SCNC: 3.6 MMOL/L (ref 3.5–5.1)
PROT SERPL-MCNC: 8.1 G/DL (ref 6.4–8.2)
PROT UR STRIP-MCNC: NEGATIVE MG/DL
RBC # BLD AUTO: 4.73 M/UL (ref 4.1–5.7)
RBC #/AREA URNS HPF: NORMAL /HPF (ref 0–5)
SALICYLATES SERPL-MCNC: <1.7 MG/DL (ref 2.8–20)
SODIUM SERPL-SCNC: 139 MMOL/L (ref 136–145)
SP GR UR REFRACTOMETRY: >1.03
SPECIMEN HOLD: NORMAL
SPECIMEN HOLD: NORMAL
UROBILINOGEN UR QL STRIP.AUTO: 0.2 EU/DL (ref 0.2–1)
WBC # BLD AUTO: 4.3 K/UL (ref 4.1–11.1)
WBC URNS QL MICRO: NORMAL /HPF (ref 0–4)

## 2024-05-26 PROCEDURE — 96376 TX/PRO/DX INJ SAME DRUG ADON: CPT

## 2024-05-26 PROCEDURE — 85025 COMPLETE CBC W/AUTO DIFF WBC: CPT

## 2024-05-26 PROCEDURE — 81001 URINALYSIS AUTO W/SCOPE: CPT

## 2024-05-26 PROCEDURE — 99285 EMERGENCY DEPT VISIT HI MDM: CPT

## 2024-05-26 PROCEDURE — 80143 DRUG ASSAY ACETAMINOPHEN: CPT

## 2024-05-26 PROCEDURE — 90791 PSYCH DIAGNOSTIC EVALUATION: CPT

## 2024-05-26 PROCEDURE — 6370000000 HC RX 637 (ALT 250 FOR IP): Performed by: EMERGENCY MEDICINE

## 2024-05-26 PROCEDURE — 82077 ASSAY SPEC XCP UR&BREATH IA: CPT

## 2024-05-26 PROCEDURE — 80179 DRUG ASSAY SALICYLATE: CPT

## 2024-05-26 PROCEDURE — 93010 ELECTROCARDIOGRAM REPORT: CPT | Performed by: INTERNAL MEDICINE

## 2024-05-26 PROCEDURE — 6360000002 HC RX W HCPCS: Performed by: EMERGENCY MEDICINE

## 2024-05-26 PROCEDURE — 80307 DRUG TEST PRSMV CHEM ANLYZR: CPT

## 2024-05-26 PROCEDURE — 96374 THER/PROPH/DIAG INJ IV PUSH: CPT

## 2024-05-26 PROCEDURE — 36415 COLL VENOUS BLD VENIPUNCTURE: CPT

## 2024-05-26 PROCEDURE — 70498 CT ANGIOGRAPHY NECK: CPT

## 2024-05-26 PROCEDURE — 2580000003 HC RX 258: Performed by: EMERGENCY MEDICINE

## 2024-05-26 PROCEDURE — 6360000004 HC RX CONTRAST MEDICATION: Performed by: RADIOLOGY

## 2024-05-26 PROCEDURE — 93005 ELECTROCARDIOGRAM TRACING: CPT | Performed by: EMERGENCY MEDICINE

## 2024-05-26 PROCEDURE — 80053 COMPREHEN METABOLIC PANEL: CPT

## 2024-05-26 RX ORDER — LORAZEPAM 2 MG/ML
2 INJECTION INTRAMUSCULAR
Status: DISCONTINUED | OUTPATIENT
Start: 2024-05-26 | End: 2024-05-27 | Stop reason: HOSPADM

## 2024-05-26 RX ORDER — LORAZEPAM 2 MG/ML
3 INJECTION INTRAMUSCULAR
Status: DISCONTINUED | OUTPATIENT
Start: 2024-05-26 | End: 2024-05-27 | Stop reason: HOSPADM

## 2024-05-26 RX ORDER — LORAZEPAM 1 MG/1
2 TABLET ORAL
Status: DISCONTINUED | OUTPATIENT
Start: 2024-05-26 | End: 2024-05-27 | Stop reason: HOSPADM

## 2024-05-26 RX ORDER — LORAZEPAM 2 MG/ML
1 INJECTION INTRAMUSCULAR EVERY 6 HOURS PRN
Status: DISCONTINUED | OUTPATIENT
Start: 2024-05-26 | End: 2024-05-27 | Stop reason: HOSPADM

## 2024-05-26 RX ORDER — LORAZEPAM 1 MG/1
4 TABLET ORAL
Status: DISCONTINUED | OUTPATIENT
Start: 2024-05-26 | End: 2024-05-27 | Stop reason: HOSPADM

## 2024-05-26 RX ORDER — 0.9 % SODIUM CHLORIDE 0.9 %
1000 INTRAVENOUS SOLUTION INTRAVENOUS
Status: COMPLETED | OUTPATIENT
Start: 2024-05-26 | End: 2024-05-26

## 2024-05-26 RX ORDER — LORAZEPAM 2 MG/ML
0.5 INJECTION INTRAMUSCULAR ONCE
Status: COMPLETED | OUTPATIENT
Start: 2024-05-26 | End: 2024-05-26

## 2024-05-26 RX ORDER — LORAZEPAM 2 MG/ML
1 INJECTION INTRAMUSCULAR EVERY 6 HOURS PRN
Status: DISCONTINUED | OUTPATIENT
Start: 2024-05-26 | End: 2024-05-26 | Stop reason: SDUPTHER

## 2024-05-26 RX ORDER — SODIUM CHLORIDE 0.9 % (FLUSH) 0.9 %
5-40 SYRINGE (ML) INJECTION PRN
Status: DISCONTINUED | OUTPATIENT
Start: 2024-05-26 | End: 2024-05-27 | Stop reason: HOSPADM

## 2024-05-26 RX ORDER — LORAZEPAM 1 MG/1
1 TABLET ORAL
Status: DISCONTINUED | OUTPATIENT
Start: 2024-05-26 | End: 2024-05-27 | Stop reason: HOSPADM

## 2024-05-26 RX ORDER — LORAZEPAM 1 MG/1
3 TABLET ORAL
Status: DISCONTINUED | OUTPATIENT
Start: 2024-05-26 | End: 2024-05-27 | Stop reason: HOSPADM

## 2024-05-26 RX ORDER — SODIUM CHLORIDE 0.9 % (FLUSH) 0.9 %
5-40 SYRINGE (ML) INJECTION EVERY 12 HOURS SCHEDULED
Status: DISCONTINUED | OUTPATIENT
Start: 2024-05-26 | End: 2024-05-27 | Stop reason: HOSPADM

## 2024-05-26 RX ORDER — LANOLIN ALCOHOL/MO/W.PET/CERES
100 CREAM (GRAM) TOPICAL DAILY
Status: DISCONTINUED | OUTPATIENT
Start: 2024-05-26 | End: 2024-05-27 | Stop reason: HOSPADM

## 2024-05-26 RX ORDER — LORAZEPAM 2 MG/ML
4 INJECTION INTRAMUSCULAR
Status: DISCONTINUED | OUTPATIENT
Start: 2024-05-26 | End: 2024-05-27 | Stop reason: HOSPADM

## 2024-05-26 RX ORDER — LORAZEPAM 2 MG/ML
1 INJECTION INTRAMUSCULAR
Status: DISCONTINUED | OUTPATIENT
Start: 2024-05-26 | End: 2024-05-27 | Stop reason: HOSPADM

## 2024-05-26 RX ADMIN — LORAZEPAM 1 MG: 2 INJECTION, SOLUTION INTRAMUSCULAR; INTRAVENOUS at 13:14

## 2024-05-26 RX ADMIN — LORAZEPAM 1 MG: 1 TABLET ORAL at 16:52

## 2024-05-26 RX ADMIN — SODIUM CHLORIDE 1000 ML: 9 INJECTION, SOLUTION INTRAVENOUS at 06:37

## 2024-05-26 RX ADMIN — Medication 10 ML: at 21:33

## 2024-05-26 RX ADMIN — LORAZEPAM 2 MG: 1 TABLET ORAL at 20:48

## 2024-05-26 RX ADMIN — IOPAMIDOL 80 ML: 755 INJECTION, SOLUTION INTRAVENOUS at 06:17

## 2024-05-26 RX ADMIN — Medication 100 MG: at 09:07

## 2024-05-26 RX ADMIN — LORAZEPAM 0.5 MG: 2 INJECTION, SOLUTION INTRAMUSCULAR; INTRAVENOUS at 07:54

## 2024-05-26 RX ADMIN — LORAZEPAM 1 MG: 1 TABLET ORAL at 19:36

## 2024-05-26 ASSESSMENT — PAIN - FUNCTIONAL ASSESSMENT: PAIN_FUNCTIONAL_ASSESSMENT: 0-10

## 2024-05-26 ASSESSMENT — PAIN SCALES - GENERAL
PAINLEVEL_OUTOF10: 0
PAINLEVEL_OUTOF10: 0

## 2024-05-26 NOTE — BSMART NOTE
ADULT VOLUNTARY BED SEARCH STARTED/RESUMED AT 5/26/2024:    [facility] ([facility]): contacted at 1609 spoke with Afia reported pt has been accepted to [facility] w/ 1:1 constant observation; however, the unit is unable to provide at this time.  [facility] continues to remain at capacity.  Dr. Sheffield declined pt from admission d/t need for 1:1 and high risk for ETOH withdrawal.  Pt meets exclusionary criteria for admission to [facility] and [facility] d/t need for ETOH detox.     [facility]: contacted at 1635 spoke with Blanca reported  they are on diversion for adults at this time.     [facility] ([facility]): contacted at 1636 spoke with Jo reported at capacity.     No other facilities were considered at this time d/t pt preference.     Karime Chadwick LMSW

## 2024-05-26 NOTE — ED NOTES
BSMART talked to patient, patient reports history of significant withdrawals. Has used Phenobarbital taper before. Dr. Aguilar notified. Withdrawal protocol ordered.

## 2024-05-26 NOTE — ED TRIAGE NOTES
Pt comes in from home with CC of SI. Pt states his plan is to hang himself. Pt reports drinking straight for the last month.

## 2024-05-26 NOTE — ED NOTES
Patient ambulatory to bathroom with tech, patient changed into gown and belongings secured in closet.  Security paged to wand patient.

## 2024-05-26 NOTE — BSMART NOTE
BSMART assessment completed, and suicide risk level noted to be HIGH. Charge Nurse, Primary Nurse and Physician notified. Concerns about pt's history of etoh withdrawal and hypoxia shared with primary nurse, charge and ED physician.

## 2024-05-26 NOTE — ED NOTES
Report given to Nikki VILLASEÑOR - pt placed on cardiac monitor for CIWA evaluation. Sitter at bedside for 1:1. Plan of care ongoing

## 2024-05-26 NOTE — BSMART NOTE
Clinician met w/ pt face to face to discuss bed search options and provide an update regarding [facility] facilities.  Pt consented for family to be present in room during this meeting.  Pt is agreeable to a local bed search ([facility] & local [facility] facilities only).  Pt denies having any questions at this time and thanked clinician for the assistance.

## 2024-05-26 NOTE — BSMART NOTE
Comprehensive Assessment Form Part 1      Section I - Disposition    Primary Diagnosis: MDD, PTSD, Alcohol use disorder    The Medical Doctor to Psychiatrist conference was notcompleted.  The Medical Doctor is in agreement with BSMART disposition.  The plan is pursue voluntary psychiatric hospitalization.  The on-call Psychiatrist consulted was Dr. WAHL.  The admitting Psychiatrist will be Dr. RAMÍREZ.  The admitting Diagnosis is as noted above.  The Payor source is as noted on face sheet.      Section II - Integrated Summary  Pt is a 40 y/o male transported to the ED via EMS. Pt reports that he attempted suicide this morning by hanging himself from a tree on the grounds of his apartment complex. He says he used a computer cord and the cord broke, so he called \"a crisis line\" and they sent police/EMS to transport pt here. Pt reports he and his wife of 20 years finalized their divorce one month ago. The divorce reportedly was pt's wife's decision, but pt was unhappy in the marriage for a long time. Pt spoke at length about their relationsip. Pt says his wife committed multiple infidelities over the course of their marriage, gaslit pt into believing that this was his fault, and alienated him from their two children (ages 17 and 18). Pt laments that he stayed in the marriage as long as he did and that he struggles not to blame himself. He endorses hopelessness and profound sadness. He repeatedly stated throughout the interview \"I want to die.\", \"I want to be dead.\"Etc.  Pt says he attempted suicide three weeks ago, shortly after the divorce ffinalized, by \"drinking myself to death.\" Pt says he initially went voluntarily to [facility] but was ultimately TDO'd because while in the ED waiting for psych bed placement \"I went outside to smoke a cigarette but they thought I was trying to leave so next thing I know I was in handcuffs.\" Pt says in hindsight, the TDO \"saved my life\" because his firearms were taken from

## 2024-05-27 VITALS
HEIGHT: 70 IN | WEIGHT: 299.83 LBS | TEMPERATURE: 98.4 F | RESPIRATION RATE: 16 BRPM | DIASTOLIC BLOOD PRESSURE: 98 MMHG | SYSTOLIC BLOOD PRESSURE: 140 MMHG | BODY MASS INDEX: 42.92 KG/M2 | OXYGEN SATURATION: 92 % | HEART RATE: 97 BPM

## 2024-05-27 LAB
ETHANOL SERPL-MCNC: <10 MG/DL (ref 0–0.08)
FLUAV RNA SPEC QL NAA+PROBE: NOT DETECTED
FLUBV RNA SPEC QL NAA+PROBE: NOT DETECTED
SARS-COV-2 RNA RESP QL NAA+PROBE: NOT DETECTED

## 2024-05-27 PROCEDURE — 87636 SARSCOV2 & INF A&B AMP PRB: CPT

## 2024-05-27 PROCEDURE — 82077 ASSAY SPEC XCP UR&BREATH IA: CPT

## 2024-05-27 PROCEDURE — 6370000000 HC RX 637 (ALT 250 FOR IP): Performed by: EMERGENCY MEDICINE

## 2024-05-27 PROCEDURE — 6370000000 HC RX 637 (ALT 250 FOR IP): Performed by: NURSE PRACTITIONER

## 2024-05-27 PROCEDURE — 36415 COLL VENOUS BLD VENIPUNCTURE: CPT

## 2024-05-27 RX ORDER — HYDROXYZINE HYDROCHLORIDE 25 MG/1
25 TABLET, FILM COATED ORAL 3 TIMES DAILY PRN
Status: DISCONTINUED | OUTPATIENT
Start: 2024-05-27 | End: 2024-05-27 | Stop reason: HOSPADM

## 2024-05-27 RX ORDER — CITALOPRAM 20 MG/1
20 TABLET ORAL DAILY
Status: DISCONTINUED | OUTPATIENT
Start: 2024-05-27 | End: 2024-05-27 | Stop reason: HOSPADM

## 2024-05-27 RX ADMIN — CITALOPRAM HYDROBROMIDE 20 MG: 20 TABLET ORAL at 12:59

## 2024-05-27 RX ADMIN — HYDROXYZINE HYDROCHLORIDE 25 MG: 25 TABLET, FILM COATED ORAL at 12:59

## 2024-05-27 RX ADMIN — LORAZEPAM 1 MG: 1 TABLET ORAL at 16:29

## 2024-05-27 RX ADMIN — LORAZEPAM 1 MG: 1 TABLET ORAL at 11:20

## 2024-05-27 NOTE — BSMART NOTE
[facility] attempted to round on the patient. The patient was observed to be asleep and snoring. 1:1 sitter remains in place.     Patient was initally accepted to placement with [facility] but 1:1 observation is recommended on unit per ACCESS. Patient consented to a bed search with [facility] and [facility] facilities only at this time. [facility] reported to be at capacity this time.     12:50am- [facility] reported they are no longer on diversion and to fax clinicals.

## 2024-05-27 NOTE — BSMART NOTE
Pt accepted to [facility] by Dr. Vladislav Davis to [facility], 3rd floor, room# 41-A. Nursing report [phone]. Please call with ETA once arranged. Emi/charge updated on acceptance.

## 2024-05-27 NOTE — BSMART NOTE
BSMART Liaison Team Note     LOS:  1 day 5 hours     Patient goal(s) for today: take medications as prescribed, make needs known in an appropriate manner, \"to feel better\"  BSMART Liaison team focus/goals: assess needs, provide support and education, coordinate care    Progress note: Met with patient face to face in his ER room with sitter at bedside. Patient reports that he is feeling better. He reports that the medication they are giving to him is helping and making him physically feel better. He reports that he is still having some withdrawal symptoms of chills, nausea, and shakes. Patient not observed with shakes though he does report just not feeling well. He denies current SI, HI,and hallucinations. He reports no thoughts of wanting to be dead. He reports continued depression and anxiety. Patient reports his anxiety is keeping him from being able to do things that he would to distract himself. He reports he enjoys reading but that he has been unable to focus on reading. Offered activity packet that has word searches and other games but patient declined reporting he could not focus on things due to anxiety. He reports sleeping a lot in the ER which has helped him feel a little better. Patient reports that he has been drinking for the 2-3 weeks since he was discharged from the other facility. He reports working but continuing to drink when not working and feeling worse. He reports that he needs help and is willing to continue to wait for placement. He is agreeable to trying the facilities outside of [place] located in [facility] but does not want to go very far. He reports feeling like he needs medication from the nurse. He reports some shakes, chills and sweats and mild nausea. He reports he had just woken up and  Was starting to feel the withdrawal. He was pleasant. Laying in bed. He declined a  stating he can speak English. No communication issues noted during

## 2024-05-27 NOTE — ED NOTES
Bedside shift change report given to KASI Corona (oncoming nurse) by KASI Constantino and Adrienne VILLASEÑOR (offgoing nurse). Report included the following information Nurse Handoff Report, Index, ED Encounter Summary, ED SBAR, Adult Overview, MAR, Recent Results, and Event Log.

## 2024-05-27 NOTE — CONSULTS
[facility]  PSYCHIATRY CONSULT NOTE:    Name: Fish Arzate  MR#: [id number]  : 1985  ACCOUNT#: [id number]  ADMIT DATE: 2024    REASON FOR CONSULT: U hold     HISTORY OF PRESENTING COMPLAINT:  Fish Arzate is a 39 y.o. male with PMH of alcohol abuse, depression and anxiety. Recently discharged from inpatient hospitalization ~ 2 weeks ago and began drinking immediately prior to being picked up and brought here. He has not had any issues with detox denies knowing of any hx DT's or sz. On CIWA with ativan protocol.Had attempted to hang self prior to admit. Medically cleared at this time and is open to more expansive bed search for inpatient U stay, He denies any thoughts presently of SI, HI, AVH, delusional thinking, or paranoia. I called [facility] and he was taking Celexa 20 mg and atarax 25 mg tid as needed prior to admit which he reports did help. He was resting in no distress in ER room when I entered easily aroused when I called his name. Pleasant and agreeable to talking, open disposition and grateful for help.    PAST PSYCHIATRIC HISTORY: MDD, DEVANG    SUBSTANCE ABUSE HISTORY: Alc abuse    PSYCHOSOCIAL HISTORY: [facility]    MENTAL STATUS EXAM:   Fish Arzate is a 39 y.o.  /  male who appears his stated age. He is cooperative with assessment questions. He makes fair eye contact.  No psychomotor agitation/retardation is observed. His speech is normal in rate, tone, and volume. His self-reported mood is \"OK\". His affect is congruent with mood and situation, full range. He denies auditory and visual hallucinations. No paranoia or delusions are elicited with assessment. His thought processes are linear and goal-directed. He denies suicidal and homicidal ideation. He is alert and oriented X 4. His memory appears intact as evidenced by conversation/answers to my questions. Insight and judgment are limited/poor.    DIAGNOSTIC IMPRESSION: MDD, recurrent, severe,

## 2024-05-27 NOTE — BSMART NOTE
Next shift to be made aware that pt is being reviewed by [facility] in which they may have to call to follow-up. Pt declined by [facility]. Pt is willing to be reviewed by Jolly Gaming, [facility], [facility], America Vargas and Mat[facility]. Bed Search completed this shift. New psych consult should be placed by morning.

## 2024-05-27 NOTE — BSMART NOTE
[facility]/Danyelle updated that updated etoh level sent to them. She will get back with MD's decision regarding acceptance. Next shoft to be made aware of disposition.

## 2024-05-27 NOTE — BSMART NOTE
3:43am- [facility] contacted to report the patient was declined from placement as he would need an acute bed and [facility] is at capacity for acute beds at this time.

## 2024-05-27 NOTE — BSMART NOTE
Danyelle/u requesting per MD requesting that new etoh level be provided. Emi/charge nurse updated on request.

## 2024-05-27 NOTE — BSMART NOTE
2:10am- Candy with [facility] called to request if patient was still in need of placement as the fax was not received. BSMART re-faxed patient's clinicals for review.

## 2024-05-27 NOTE — BSMART NOTE
[facility] Bed Search    Declined by Dr. Wheeler for [facility]  Clinton Garrison 1:16/Taza- \"send packet over\"  [facility]ilda 1:20/Libia- \"at capacity for male beds\"  America Vargas 1:25/Nathalia- \"at capacity for 24 hours but we'll have discharges tomorrow.\"  [facility]  1:21/Marilyn- \"fax over\"

## 2024-05-27 NOTE — ED NOTES
Patient signed out to me by outgoing team.  Patient behavioral health hold with CIWA protocol in place and as needed meds ordered.  Patient resting in no distress vital signs are stable, nontremulous.  Signed out to incoming team for further management.    MD Rafia Maunel Amrita, MD  05/26/24 [id number]

## 2024-05-27 NOTE — ED NOTES
PT stated feeling sweaty and that the medication is not working for him. ADV PT that nurse will be ADV and will reach out to doctor.

## 2024-05-27 NOTE — ED PROVIDER NOTES
ED SIGN OUT NOTE  Care assumed at [facility] 6:58 AM EDT    Patient was signed out to me by Dr. Morataya.     Patient is awaiting bed placement.    BP (!) 140/98   Pulse 97   Temp 98.4 °F (36.9 °C) (Oral)   Resp 16   Ht 1.778 m (5' 10\")   Wt 136 kg (299 lb 13.2 oz)   SpO2 92%   BMI 43.02 kg/m²     Labs Reviewed   ETHANOL - Abnormal; Notable for the following components:       Result Value    Ethanol Lvl 289 (*)     All other components within normal limits   SALICYLATE LEVEL - Abnormal; Notable for the following components:    Salicylate, Serum <1.7 (*)     All other components within normal limits   ACETAMINOPHEN LEVEL - Abnormal; Notable for the following components:    Acetaminophen Level <2 (*)     All other components within normal limits   CBC WITH AUTO DIFFERENTIAL - Abnormal; Notable for the following components:    Platelets 144 (*)     All other components within normal limits   COMPREHENSIVE METABOLIC PANEL - Abnormal; Notable for the following components:    Glucose 112 (*)     BUN 4 (*)     BUN/Creatinine Ratio 5 (*)      (*)      (*)     Alk Phosphatase 172 (*)     Globulin 4.3 (*)     Albumin/Globulin Ratio 0.9 (*)     All other components within normal limits   URINE CULTURE HOLD SAMPLE   URINALYSIS WITH MICROSCOPIC   URINE DRUG SCREEN   EXTRA TUBES HOLD     CTA NECK W CONTRAST   Final Result   No vascular dissection or cervical hematoma.   No cervical spine fracture or traumatic malalignment..          ED Course as of 05/27/24 0658   Sun May 26, 2024   0708 I assumed care from Dr. Jacob at 7 AM awaiting medical clearance, be smart consult and placement []   0747 Behavioral health will be placed in this patient due to his suicidal ideation.  CT neck is negative and labs show some evidence of alcoholic hepatitis and alcohol is elevated.  He is given some Ativan versus alcohol drops to help with his withdrawal.  [facility] will be placing him an inpatient bed and 
appears well-developed and well-nourished. No distress.  Head: Normocephalic and atraumatic. Sclera anicteric  Nose: No rhinorrhea  Mouth/Throat: Oropharynx is clear and moist. Pharynx normal  Eyes: Conjunctivae are normal. Pupils are equal, round, and reactive to light. Right eye exhibits no discharge. Left eye exhibits no discharge.  Neck: Painless normal range of motion. Neck supple. No LAD. No signs of external trauma. No midline tenderness. No stepoffs or deformities.  Cardiovascular: Normal rate, regular rhythm, normal heart sounds and intact distal pulses.  Exam reveals no gallop and no friction rub.  No murmur heard.  Pulmonary/Chest:  No respiratory distress. No wheezes. No rales. No rhonchi. No increased work of breathing. No accessory muscle use. Good air exchange throughout.  Abdominal: soft, non-tender, no rebound or guarding. No hepatosplenomegaly. Normal bowel sounds throughout.  Back: no tenderness to palpation, no deformities, no CVA tenderness  Extremities/Musculoskeletal: Normal range of motion. no tenderness. No edema. Distal extremities are neurovasc intact.  Lymphadenopathy:   No adenopathy.   Neurological:  Alert and oriented to person, place, and time. Coordination normal. CN 2-12 intact. Motor and sensory function intact.  Skin: Skin is warm and dry. No rash noted. No pallor.          EMERGENCY DEPARTMENT COURSE and DIFFERENTIAL DIAGNOSIS/MDM:         Medical Decision Making  Amount and/or Complexity of Data Reviewed  Labs: ordered.  Radiology: ordered.  ECG/medicine tests: ordered.    Risk  Prescription drug management.        39y M here with SI. States he tried to hang himself. Will check CTA neck but no evidence of trauma. Admits to etoh. Will check labs, give fluids, and have BSMART evaluate.      ED EKG interpretation:  Rhythm: sinus tachycardia; and regular . Rate (approx.): 112; Axis: normal; P wave: normal; QRS interval: normal ; ST/T wave: normal;  This EKG was interpreted by